# Patient Record
Sex: MALE | Race: WHITE | NOT HISPANIC OR LATINO | Employment: FULL TIME | URBAN - METROPOLITAN AREA
[De-identification: names, ages, dates, MRNs, and addresses within clinical notes are randomized per-mention and may not be internally consistent; named-entity substitution may affect disease eponyms.]

---

## 2017-01-11 ENCOUNTER — RADIANT APPOINTMENT (OUTPATIENT)
Dept: GENERAL RADIOLOGY | Facility: CLINIC | Age: 42
End: 2017-01-11
Attending: PHYSICIAN ASSISTANT
Payer: COMMERCIAL

## 2017-01-11 ENCOUNTER — OFFICE VISIT (OUTPATIENT)
Dept: FAMILY MEDICINE | Facility: CLINIC | Age: 42
End: 2017-01-11
Payer: COMMERCIAL

## 2017-01-11 VITALS
DIASTOLIC BLOOD PRESSURE: 65 MMHG | TEMPERATURE: 97.9 F | WEIGHT: 265.4 LBS | BODY MASS INDEX: 39.31 KG/M2 | HEART RATE: 71 BPM | HEIGHT: 69 IN | SYSTOLIC BLOOD PRESSURE: 126 MMHG | OXYGEN SATURATION: 100 %

## 2017-01-11 DIAGNOSIS — G47.33 OSA (OBSTRUCTIVE SLEEP APNEA): ICD-10-CM

## 2017-01-11 DIAGNOSIS — R05.3 PERSISTENT COUGH FOR 3 WEEKS OR LONGER: ICD-10-CM

## 2017-01-11 DIAGNOSIS — R05.3 PERSISTENT COUGH FOR 3 WEEKS OR LONGER: Primary | ICD-10-CM

## 2017-01-11 DIAGNOSIS — E11.9 TYPE 2 DIABETES MELLITUS WITHOUT COMPLICATION, WITHOUT LONG-TERM CURRENT USE OF INSULIN (H): ICD-10-CM

## 2017-01-11 LAB
ANION GAP SERPL CALCULATED.3IONS-SCNC: 7 MMOL/L (ref 3–14)
BUN SERPL-MCNC: 7 MG/DL (ref 7–30)
CALCIUM SERPL-MCNC: 8.7 MG/DL (ref 8.5–10.1)
CHLORIDE SERPL-SCNC: 106 MMOL/L (ref 94–109)
CO2 SERPL-SCNC: 29 MMOL/L (ref 20–32)
CREAT SERPL-MCNC: 0.84 MG/DL (ref 0.66–1.25)
CREAT UR-MCNC: 47 MG/DL
GFR SERPL CREATININE-BSD FRML MDRD: ABNORMAL ML/MIN/1.7M2
GLUCOSE SERPL-MCNC: 131 MG/DL (ref 70–99)
HBA1C MFR BLD: 6.6 % (ref 4.3–6)
LDLC SERPL DIRECT ASSAY-MCNC: 37 MG/DL
MICROALBUMIN UR-MCNC: <5 MG/L
MICROALBUMIN/CREAT UR: NORMAL MG/G CR (ref 0–17)
POTASSIUM SERPL-SCNC: 3.7 MMOL/L (ref 3.4–5.3)
SODIUM SERPL-SCNC: 142 MMOL/L (ref 133–144)

## 2017-01-11 PROCEDURE — 80048 BASIC METABOLIC PNL TOTAL CA: CPT | Performed by: PHYSICIAN ASSISTANT

## 2017-01-11 PROCEDURE — 99214 OFFICE O/P EST MOD 30 MIN: CPT | Performed by: PHYSICIAN ASSISTANT

## 2017-01-11 PROCEDURE — 83036 HEMOGLOBIN GLYCOSYLATED A1C: CPT | Performed by: PHYSICIAN ASSISTANT

## 2017-01-11 PROCEDURE — 82043 UR ALBUMIN QUANTITATIVE: CPT | Performed by: PHYSICIAN ASSISTANT

## 2017-01-11 PROCEDURE — 99207 C FOOT EXAM  NO CHARGE: CPT | Performed by: PHYSICIAN ASSISTANT

## 2017-01-11 PROCEDURE — 36415 COLL VENOUS BLD VENIPUNCTURE: CPT | Performed by: PHYSICIAN ASSISTANT

## 2017-01-11 PROCEDURE — 71020 XR CHEST 2 VW: CPT

## 2017-01-11 PROCEDURE — 83721 ASSAY OF BLOOD LIPOPROTEIN: CPT | Performed by: PHYSICIAN ASSISTANT

## 2017-01-11 RX ORDER — BENZONATATE 200 MG/1
200 CAPSULE ORAL 3 TIMES DAILY PRN
Qty: 21 CAPSULE | Refills: 0 | Status: SHIPPED | OUTPATIENT
Start: 2017-01-11 | End: 2022-03-21

## 2017-01-11 RX ORDER — CODEINE PHOSPHATE AND GUAIFENESIN 10; 100 MG/5ML; MG/5ML
1-2 SOLUTION ORAL EVERY 4 HOURS PRN
Qty: 120 ML | Refills: 0 | Status: SHIPPED | OUTPATIENT
Start: 2017-01-11 | End: 2022-03-21

## 2017-01-11 RX ORDER — ALBUTEROL SULFATE 90 UG/1
2 AEROSOL, METERED RESPIRATORY (INHALATION) EVERY 6 HOURS PRN
Qty: 1 INHALER | Refills: 0 | Status: SHIPPED | OUTPATIENT
Start: 2017-01-11 | End: 2022-03-21

## 2017-01-11 NOTE — PATIENT INSTRUCTIONS
Have plenty of rest and fluids  Humidified air can be very helpful with cough  Use inhaler every 4-6 hours during this illness  Use robitussin at night - can make you groggy  Use tessalon cough suppressants up to three times during the day to cut cough  Use nasal spray Afrin OTC for 3-4 days in each nostril for congestion  Sinus rinse/neti pot  Follow up if worsening symptoms or if not improving - call if sinuses are worsening  Be seen urgently if worsening breathing

## 2017-01-11 NOTE — MR AVS SNAPSHOT
After Visit Summary   1/11/2017    Brett Fountain    MRN: [id number]           Patient Information     Date Of Birth          1975        Visit Information        Provider Department      1/11/2017 2:20 PM Iris Garcia PA-C [facility]        Today's Diagnoses     Persistent cough for 3 weeks or longer    -  1     Type 2 diabetes mellitus without complication, without long-term current use of insulin (H)           Care Instructions    Have plenty of rest and fluids  Humidified air can be very helpful with cough  Use inhaler every 4-6 hours during this illness  Use robitussin at night - can make you groggy  Use tessalon cough suppressants up to three times during the day to cut cough  Use nasal spray Afrin OTC for 3-4 days in each nostril for congestion  Sinus rinse/neti pot  Follow up if worsening symptoms or if not improving - call if sinuses are worsening  Be seen urgently if worsening breathing         Follow-ups after your visit        Who to contact     Normal or non-critical lab and imaging results will be communicated to you by apartumhart, letter or phone within 4 business days after the clinic has received the results. If you do not hear from us within 7 days, please contact the clinic through [employer] or phone. If you have a critical or abnormal lab result, we will notify you by phone as soon as possible.  Submit refill requests through [employer] or call your pharmacy and they will forward the refill request to us. Please allow 3 business days for your refill to be completed.          If you need to speak with a  for additional information , please call: [phone]             Additional Information About Your Visit        [employer] Information     [employer] gives you secure access to your electronic health record. If you see a primary care provider, you can also send messages to your care team and make appointments. If you have questions, please call your primary  "care clinic.  If you do not have a primary care provider, please call [phone] and they will assist you.        Care EveryWhere ID     This is your Care EveryWhere ID. This could be used by other organizations to access your [facility] medical records  [id number]        Your Vitals Were     Pulse Temperature Height BMI (Body Mass Index) Pulse Oximetry       71 97.9  F (36.6  C) (Tympanic) 5' 9\" (1.753 m) 39.17 kg/m2 100%        Blood Pressure from Last 3 Encounters:   01/11/17 126/65   10/28/15 132/83   10/26/15 125/78    Weight from Last 3 Encounters:   01/11/17 265 lb 6.4 oz (120.385 kg)   10/26/15 271 lb (122.925 kg)   10/21/15 274 lb (124.286 kg)              We Performed the Following     Basic metabolic panel     Hemoglobin A1c     LDL cholesterol direct     MICROALBUMIN          Today's Medication Changes          These changes are accurate as of: 1/11/17  3:12 PM.  If you have any questions, ask your nurse or doctor.               Start taking these medicines.        Dose/Directions    albuterol 108 (90 BASE) MCG/ACT Inhaler   Commonly known as:  PROAIR HFA/PROVENTIL HFA/VENTOLIN HFA   Used for:  Persistent cough for 3 weeks or longer   Started by:  Iris Garcia PA-C        Dose:  2 puff   Inhale 2 puffs into the lungs every 6 hours as needed for shortness of breath / dyspnea or wheezing   Quantity:  1 Inhaler   Refills:  0       benzonatate 200 MG capsule   Commonly known as:  TESSALON   Used for:  Persistent cough for 3 weeks or longer   Started by:  Iris Garcia PA-C        Dose:  200 mg   Take 1 capsule (200 mg) by mouth 3 times daily as needed for cough   Quantity:  21 capsule   Refills:  0       guaiFENesin-codeine 100-10 MG/5ML Soln solution   Commonly known as:  ROBITUSSIN AC   Used for:  Persistent cough for 3 weeks or longer   Started by:  Iris Garcia PA-C        Dose:  1-2 tsp.   Take 5-10 mLs by mouth every 4 hours as needed   Quantity:  120 mL   Refills:  0          "   Where to get your medicines      These medications were sent to [facility] - [place], [place] - [address]  [address] [facility] [address]     Phone:  [phone]    - albuterol 108 (90 BASE) MCG/ACT Inhaler  - benzonatate 200 MG capsule      Some of these will need a paper prescription and others can be bought over the counter.  Ask your nurse if you have questions.     Bring a paper prescription for each of these medications    - guaiFENesin-codeine 100-10 MG/5ML Soln solution             Primary Care Provider    [facility]                Thank you!     Thank you for choosing [facility]  for your care. Our goal is always to provide you with excellent care. Hearing back from our patients is one way we can continue to improve our services. Please take a few minutes to complete the written survey that you may receive in the mail after your visit with us. Thank you!             Your Updated Medication List - Protect others around you: Learn how to safely use, store and throw away your medicines at www.disposemymeds.org.          This list is accurate as of: 1/11/17  3:12 PM.  Always use your most recent med list.                   Brand Name Dispense Instructions for use    albuterol 108 (90 BASE) MCG/ACT Inhaler    PROAIR HFA/PROVENTIL HFA/VENTOLIN HFA    1 Inhaler    Inhale 2 puffs into the lungs every 6 hours as needed for shortness of breath / dyspnea or wheezing       benzonatate 200 MG capsule    TESSALON    21 capsule    Take 1 capsule (200 mg) by mouth 3 times daily as needed for cough       guaiFENesin-codeine 100-10 MG/5ML Soln solution    ROBITUSSIN AC    120 mL    Take 5-10 mLs by mouth every 4 hours as needed

## 2017-01-11 NOTE — PROGRESS NOTES
SUBJECTIVE:                                                    Brett Fountain is a 41 year old male who presents to clinic today for the following health issues:    ENT Symptoms             Symptoms: cc Present Absent Comment   Fever/Chills   x    Fatigue  x     Muscle Aches   x    Eye Irritation x x  Both eyes red and irritated and matted shut when he woke up this morning   Sneezing       Nasal Fadi/Drg  x     Sinus Pressure/Pain x x     Loss of smell   x    Dental pain   x    Sore Throat  x  Mild irritation from coughing   Swollen Glands   x    Ear Pain/Fullness   x    Cough x x  Mostly dry but does vomit up mucus   Wheeze   x    Chest Pain   x    Shortness of breath   x    Rash       Other  x  Was traveling out of country to [facility]       Symptom duration:  about 3 weeks ago   Symptom severity:  moderate   Treatments tried:  Zpak, with some relief   Contacts:  Kids sick, recent travels out of country      Took zpak 2.5 weeks ago - had been having productive cough  Back a few days ago     Problem list and histories reviewed & adjusted, as indicated.  Additional history: none    Recent Labs   Lab Test  01/11/17   1514  10/21/15   0955  07/11/14   1442   A1C  6.6*  8.1*  7.4*   LDL  37  36   --    HDL   --   32*   --    TRIG   --   161*   --    ALT   --   66   --    CR  0.84  0.86   --    GFRESTIMATED  >90  Non  GFR Calc    >90  Non  GFR Calc     --    GFRESTBLACK  >90   GFR Calc    >90   GFR Calc     --    POTASSIUM  3.7  3.9   --    TSH   --   0.60   --       Labs reviewed in EPIC  Problem list, Medication list, Allergies, and Medical/Social/Surgical histories reviewed in [facility] and updated as appropriate.    ROS:  Other than noted above, general, HEENT, respiratory, cardiac and gastrointestinal systems are negative.     OBJECTIVE:                                                    /65 mmHg  Pulse 71  Temp(Src) 97.9  F (36.6  C) (Tympanic)   "Ht 5' 9\" (1.753 m)  Wt 265 lb 6.4 oz (120.385 kg)  BMI 39.17 kg/m2  SpO2 100% Body mass index is 39.17 kg/(m^2).   GENERAL: healthy, alert, well nourished, well hydrated, no distress  HENT: ear canals- normal; TMs- normal; Nose- normal; Mouth- no ulcers, no lesions  NECK: no tenderness, no adenopathy, no asymmetry, no masses, no stiffness; thyroid- normal to palpation  RESP: lungs clear to auscultation - no rales, no rhonchi, no wheezes  CV: regular rates and rhythm, normal S1 S2, no S3 or S4 and no murmur, no click or rub -  ABDOMEN: soft, no tenderness, no  hepatosplenomegaly, no masses, normal bowel sounds  Diabetic foot exam: normal DP and PT pulses, no trophic changes or ulcerative lesions and normal sensory exam. POSITIVE dry skin feet. Patient declines KOH prep for possible athletes foot - no itching      CXR - IMPRESSION: Heart and lungs negative. Degenerative changes in the thoracic spine.    I independently viewed the x-ray, discussed with patient, and am awaiting radiology read.      ASSESSMENT/PLAN:                                                      ASSESSMENT/PLAN:      ICD-10-CM    1. Persistent cough for 3 weeks or longer R05 XR Chest 2 Views     albuterol (PROAIR HFA/PROVENTIL HFA/VENTOLIN HFA) 108 (90 BASE) MCG/ACT Inhaler     benzonatate (TESSALON) 200 MG capsule     guaiFENesin-codeine (ROBITUSSIN AC) 100-10 MG/5ML SOLN solution   2. Type 2 diabetes mellitus without complication, without long-term current use of insulin (H) E11.9 Hemoglobin A1c     Basic metabolic panel     MICROALBUMIN     LDL cholesterol direct     Albumin Random Urine Quantitative     MICROALBUMIN     FOOT EXAM     ACE/ARB NOT PRESCRIBED, INTENTIONAL,     STATIN NOT PRESCRIBED, INTENTIONAL,   3. CRIS (obstructive sleep apnea) G47.33      Patient well appearing, breathing easily in clinic, speaking in full sentences easily, no signs of cyanosis or respiratory distress.  Dry wheezy cough in visit  Note: diabetes discussed " today. Patient agreeable to labs. He has been controlling diabetes with diet/exercise and checks blood glucose which has been okay. He does not want to talk about it too much, discussed need for follow up for diabetes.    Patient Instructions   Have plenty of rest and fluids  Humidified air can be very helpful with cough  Use inhaler every 4-6 hours during this illness  Use robitussin at night - can make you groggy  Use tessalon cough suppressants up to three times during the day to cut cough  Use nasal spray Afrin OTC for 3-4 days in each nostril for congestion  Sinus rinse/neti pot  Follow up if worsening symptoms or if not improving - call if sinuses are worsening  Be seen urgently if worsening breathing      reports that he has quit smoking. He has quit using smokeless tobacco. His smokeless tobacco use included Chew.    Iris Garcia PA-C   [facility]

## 2017-01-11 NOTE — NURSING NOTE
"Chief Complaint   Patient presents with     Sinus Problem       Initial /65 mmHg  Pulse 71  Temp(Src) 97.9  F (36.6  C) (Tympanic)  Ht 5' 9\" (1.753 m)  Wt 265 lb 6.4 oz (120.385 kg)  BMI 39.17 kg/m2  SpO2 100% Estimated body mass index is 39.17 kg/(m^2) as calculated from the following:    Height as of this encounter: 5' 9\" (1.753 m).    Weight as of this encounter: 265 lb 6.4 oz (120.385 kg).  BP completed using cuff size: anaid Davidson CMA  "

## 2017-01-12 PROBLEM — G47.33 OSA (OBSTRUCTIVE SLEEP APNEA): Status: ACTIVE | Noted: 2017-01-12

## 2020-03-02 ENCOUNTER — HEALTH MAINTENANCE LETTER (OUTPATIENT)
Age: 45
End: 2020-03-02

## 2020-12-20 ENCOUNTER — HEALTH MAINTENANCE LETTER (OUTPATIENT)
Age: 45
End: 2020-12-20

## 2021-01-20 ENCOUNTER — OFFICE VISIT - HEALTHEAST (OUTPATIENT)
Dept: FAMILY MEDICINE | Facility: CLINIC | Age: 46
End: 2021-01-20

## 2021-01-20 DIAGNOSIS — Z76.0 ENCOUNTER FOR MEDICATION REFILL: ICD-10-CM

## 2021-01-20 DIAGNOSIS — E11.9 TYPE 2 DIABETES MELLITUS WITHOUT COMPLICATION, WITHOUT LONG-TERM CURRENT USE OF INSULIN (H): ICD-10-CM

## 2021-01-20 DIAGNOSIS — R07.0 THROAT PAIN: ICD-10-CM

## 2021-01-20 DIAGNOSIS — Z11.52 ENCOUNTER FOR SCREENING FOR COVID-19: ICD-10-CM

## 2021-01-20 DIAGNOSIS — R73.9 HYPERGLYCEMIA: ICD-10-CM

## 2021-01-20 LAB
DEPRECATED S PYO AG THROAT QL EIA: NORMAL
GROUP A STREP BY PCR: NORMAL

## 2021-01-21 ENCOUNTER — COMMUNICATION - HEALTHEAST (OUTPATIENT)
Dept: SCHEDULING | Facility: CLINIC | Age: 46
End: 2021-01-21

## 2021-04-24 ENCOUNTER — HEALTH MAINTENANCE LETTER (OUTPATIENT)
Age: 46
End: 2021-04-24

## 2021-05-27 VITALS
SYSTOLIC BLOOD PRESSURE: 138 MMHG | HEART RATE: 90 BPM | RESPIRATION RATE: 12 BRPM | TEMPERATURE: 99.1 F | OXYGEN SATURATION: 98 % | DIASTOLIC BLOOD PRESSURE: 89 MMHG

## 2021-06-18 NOTE — PATIENT INSTRUCTIONS - HE
Patient Instructions by Bakari Littlejohn PA-C at 1/20/2021  8:00 AM     Author: Bakari Littlejohn PA-C Service: -- Author Type: Physician Assistant    Filed: 1/20/2021  9:09 AM Encounter Date: 1/20/2021 Status: Addendum    : Bakari Littlejohn PA-C (Physician Assistant)    Related Notes: Original Note by Bakari Littlejohn PA-C (Physician Assistant) filed at 1/20/2021  9:07 AM       Your blood glucose level is markedly high at 300.  I recommend that you go to the emergency room and have IV hydration, insulin therapy and labs to reevaluate your blood glucose and your kidney function.  Once the kidney function is obtained a refill of Metformin can be written.  I wrote a referral to family medicine to have establish care.  Establish care with a primary care provider.  Covid screening you have been screened for COVID-19 in the office.  Those results will be available in [employer] and you will be contacted in 24 hours with results.    Suggested increased rest increased fluids and bedside humidification  Over-the-counter Tylenol for comfort.  Follow packaging directions  Over-the-counter throat lozenges with benzocaine such as Cepacol may be used if indicated and is not a choking hazard based on age.  Follow packaging directions.  Do not overuse the benzocaine as it will dry the throat and make it uncomfortable.    Self-Care for Sore Throats  Sore throats happen for many reasons, such as colds, allergies, and infections caused by viruses or bacteria. In any case, your throat becomes red and sore. Your goal for self-care is to reduce your discomfort while giving your throat a chance to heal.    Moisten and soothe your throat  Tips include the following:    Try a sip of water first thing after waking up.    Keep your throat moist by drinking 6 or more glasses of clear liquids every day.    Run a cool-air humidifier in your room overnight.    Avoid cigarette smoke.     Suck on throat lozenges, cough drops, hard candy, ice chips, or  frozen fruit-juice bars. Use the sugar-free versions if your diet or medical condition requires them.  Gargle to ease irritation  Gargling every hour or 2 can ease irritation. Try gargling with 1 of these solutions:    1/4 teaspoon of salt in 1/2 cup of warm water    An over-the-counter anesthetic gargle  Use medicine for more relief  Over-the-counter medicine can reduce sore throat symptoms. Ask your pharmacist if you have questions about which medicine to use:    Ease pain with anesthetic sprays. Aspirin or an aspirin substitute also helps. Remember, never give aspirin to anyone 18 or younger, or if you are already taking blood thinners.     For sore throats caused by allergies, try antihistamines to block the allergic reaction.    Remember: unless a sore throat is caused by a bacterial infection, antibiotics wont help you.  Prevent future sore throats  Prevention tips include the following:    Stop smoking or reduce contact with secondhand smoke. Smoke irritates the tender throat lining.    Limit contact with pets and with allergy-causing substances, such as pollen and mold.    When youre around someone with a sore throat or cold, wash your hands often to keep viruses or bacteria from spreading.    Dont strain your vocal cords.  Call your healthcare provider  Contact your healthcare provider if you have:    A temperature over 101 F (38.3 C)    White spots on the throat    Great difficulty swallowing    Trouble breathing    A skin rash    Recent exposure to someone else with strep bacteria    Severe hoarseness and swollen glands in the neck or jaw   Date Last Reviewed: 8/1/2016 2000-2016 [employer]. [address] [place] [address], [place] [address]. All rights reserved. This information is not intended as a substitute for professional medical care. Always follow your healthcare professional's instructions.    Discharge Instructions for COVID-19 Patients  You have--or may have--COVID-19. Please follow the  "instructions listed below.   If you have a weakened immune system, discuss with your doctor any other actions you need to take.  How can I protect others?  If you have symptoms (fever, cough, body aches or trouble breathing):    Stay home and away from others (self-isolate) until:  ? Your other symptoms have resolved (gotten better). And?  ? You've had no fever--and no medicine that reduces fever--for 1 full day (24 hours). And?  ? At least 10 days have passed since your symptoms started. (You may need to wait 20 days. Follow the advice of your care team.)  If you don't show symptoms, but testing showed that you have COVID-19:    Stay home and away from others (self-isolate) until at least 10 days have passed since the date of your first positive COVID-19 test.  During this time    Stay in your own room, even for meals. Use your own bathroom if you can.    Stay away from others in your home. No hugging, kissing or shaking hands. No visitors.    Don't go to work, school or anywhere else.    Clean \"high touch\" surfaces often (doorknobs, counters, handles). Use household cleaning spray or wipes.    You'll find a full list of  on the EPA website: www.epa.gov/pesticide-registration/list-n-disinfectants-use-against-sars-cov-2.    Cover your mouth and nose with a mask or other face covering to avoid spreading germs.    Wash your hands and face often. Use soap and water.    Caregivers in these groups are at risk for severe illness due to COVID-19:  ? People 65 years and older  ? People who live in a nursing home or long-term care facility  ? People with chronic disease (lung, heart, cancer, diabetes, kidney, liver, immunologic)  ? People who have a weakened immune system, including those who:    Are in cancer treatment    Take medicine that weakens the immune system, such as corticosteroids    Had a bone marrow or organ transplant    Have an immune deficiency    Have poorly controlled HIV or AIDS    Are obese (body " mass index of 40 or higher)    Smoke regularly    Caregivers should wear gloves while washing dishes, handling laundry and cleaning bedrooms and bathrooms.    Use caution when washing and drying laundry: Don't shake dirty laundry and use the warmest water setting that you can.    For more tips on managing your health at home, go to www.cdc.gov/coronavirus/2019-ncov/downloads/10Things.pdf.  How can I take care of myself at home?  1. Get lots of rest. Drink extra fluids (unless a doctor has told you not to).  2. Take Tylenol (acetaminophen) for fever or pain. If you have liver or kidney problems, ask your family doctor if it's okay to take Tylenol.   Adults can take either:   ? 650 mg (two 325 mg pills) every 4 to 6 hours, or?  ? 1,000 mg (two 500 mg pills) every 8 hours as needed.  ? Note: Don't take more than 3,000 mg in one day. Acetaminophen is found in many medicines (both prescribed and over-the-counter medicines). Read all labels to be sure you don't take too much.   For children, check the Tylenol bottle for the right dose. The dose is based on the child's age or weight.  3. If you have other health problems (like cancer, heart failure, an organ transplant or severe kidney disease): Call your specialty clinic if you don't feel better in the next 2 days.  4. Know when to call 911. Emergency warning signs include:  ? Trouble breathing or shortness of breath  ? Pain or pressure in the chest that doesn't go away  ? Feeling confused like you haven't felt before, or not being able to wake up  ? Bluish-colored lips or face  5. Your doctor may have prescribed a blood thinner medicine. Follow their instructions.  Where can I get more information?     [facility] - About COVID-19: [email or web address]    CDC - What to Do If You're Sick: www.cdc.gov/coronavirus/2019-ncov/about/steps-when-sick.html    CDC - Ending Home Isolation:  www.cdc.gov/coronavirus/2019-ncov/hcp/disposition-in-home-patients.html    CDC - Caring for Someone: www.cdc.gov/coronavirus/2019-ncov/if-you-are-sick/care-for-someone.html    [facility] - Interim Guidance for Hospital Discharge to Home: www.health.[facility].[place]/diseases/coronavirus/hcp/hospdischarge.pdf    Below are the COVID-19 hotlines at the [place] Department of Health ([facility]). Interpreters are available.  ? For health questions: Call 966-909-0618 or 1-464.618.5989 (7 a.m. to 7 p.m.)  ? For questions about schools and childcare: Call 167-682-9789 or 1-737.146.5927 (7 a.m. to 7 p.m.)    For informational purposes only. Not to replace the advice of your health care provider. Clinically reviewed by Dr. Chepe Wilcox.   Copyright   2020 [facility]. All rights reserved. [employer] 800011 - REV 01/05/21.

## 2021-06-30 NOTE — PROGRESS NOTES
Progress Notes by Bakari Littlejohn PA-C at 1/20/2021  8:00 AM     Author: Bakari Littlejohn PA-C Service: -- Author Type: Physician Assistant    Filed: 1/20/2021  9:52 AM Encounter Date: 1/20/2021 Status: Signed    : Bakari Littlejohn PA-C (Physician Assistant)       Chief Complaint   Patient presents with   ? Nasal Congestion     no fever, congestion, runny nose, slight cough, no bodyaches or fatigue, headache, started friday, sore throat   ? Medication Refill     metformin         Clinical Decision Making:  I had a long conversation with patient stating that he has been off his Metformin for 6 months.  His blood glucose levels could be going up or down.  He does not have any known hemoglobin A1c recently.  It would be helpful to have a recent hemoglobin A1c to look and see what his blood sugars have been averaging.  Additionally has not had a recent creatinine in the chart so he cannot get a refill of Metformin.  I do not feel comfortable refilling his Metformin until his kidney function is known.  This was explained to the patient.  Evaluation for strep throat which was returned as negative, COVID-19 screening was obtained because of his exposure to his child and his current symptoms, and a discussion regarding his establishing care with a primary care provider and retreating his diabetes.  For short-term treatment patient will be sent to the emergency room for IV hydration therapy, insulin to get the blood glucose down and then point-of-care testing with hemoglobin A1c and basic metabolic for creatinine evaluation.  Patient will then be able to get a refill for 30 days on his Metformin.  Referral is also written to family medicine for evaluation.  Questions were answered to patient satisfaction before discharge.    Total of 60 minutes was spent reviewing the patient's chart, reviewing lab results, coordinating care for his diabetes and contacting the emergency room staff for referral.            1. Encounter for  screening for COVID-19  Symptomatic COVID-19 Virus (CORONAVIRUS) PCR   2. Throat pain  Rapid Strep A Screen-Throat swab    Group A Strep PCR Throat Swab   3. Type 2 diabetes mellitus without complication, without long-term current use of insulin (H)  Ambulatory referral to Family Practice   4. Encounter for medication refill  Ambulatory referral to Family Practice   5. Hyperglycemia  Ambulatory referral to Family Practice         Patient Instructions     Your blood glucose level is markedly high at 300.  I recommend that you go to the emergency room and have IV hydration, insulin therapy and labs to reevaluate your blood glucose and your kidney function.  Once the kidney function is obtained a refill of Metformin can be written.  I wrote a referral to family medicine to have establish care.  Establish care with a primary care provider.  Covid screening you have been screened for COVID-19 in the office.  Those results will be available in [employer] and you will be contacted in 24 hours with results.    Suggested increased rest increased fluids and bedside humidification  Over-the-counter Tylenol for comfort.  Follow packaging directions  Over-the-counter throat lozenges with benzocaine such as Cepacol may be used if indicated and is not a choking hazard based on age.  Follow packaging directions.  Do not overuse the benzocaine as it will dry the throat and make it uncomfortable.    Self-Care for Sore Throats  Sore throats happen for many reasons, such as colds, allergies, and infections caused by viruses or bacteria. In any case, your throat becomes red and sore. Your goal for self-care is to reduce your discomfort while giving your throat a chance to heal.    Moisten and soothe your throat  Tips include the following:    Try a sip of water first thing after waking up.    Keep your throat moist by drinking 6 or more glasses of clear liquids every day.    Run a cool-air humidifier in your room overnight.    Avoid  cigarette smoke.     Suck on throat lozenges, cough drops, hard candy, ice chips, or frozen fruit-juice bars. Use the sugar-free versions if your diet or medical condition requires them.  Gargle to ease irritation  Gargling every hour or 2 can ease irritation. Try gargling with 1 of these solutions:    1/4 teaspoon of salt in 1/2 cup of warm water    An over-the-counter anesthetic gargle  Use medicine for more relief  Over-the-counter medicine can reduce sore throat symptoms. Ask your pharmacist if you have questions about which medicine to use:    Ease pain with anesthetic sprays. Aspirin or an aspirin substitute also helps. Remember, never give aspirin to anyone 18 or younger, or if you are already taking blood thinners.     For sore throats caused by allergies, try antihistamines to block the allergic reaction.    Remember: unless a sore throat is caused by a bacterial infection, antibiotics wont help you.  Prevent future sore throats  Prevention tips include the following:    Stop smoking or reduce contact with secondhand smoke. Smoke irritates the tender throat lining.    Limit contact with pets and with allergy-causing substances, such as pollen and mold.    When youre around someone with a sore throat or cold, wash your hands often to keep viruses or bacteria from spreading.    Dont strain your vocal cords.  Call your healthcare provider  Contact your healthcare provider if you have:    A temperature over 101 F (38.3 C)    White spots on the throat    Great difficulty swallowing    Trouble breathing    A skin rash    Recent exposure to someone else with strep bacteria    Severe hoarseness and swollen glands in the neck or jaw   Date Last Reviewed: 8/1/2016 2000-2016 [employer]. [address] [place] [address], [place] [address]. All rights reserved. This information is not intended as a substitute for professional medical care. Always follow your healthcare professional's instructions.    Discharge  "Instructions for COVID-19 Patients  You have--or may have--COVID-19. Please follow the instructions listed below.   If you have a weakened immune system, discuss with your doctor any other actions you need to take.  How can I protect others?  If you have symptoms (fever, cough, body aches or trouble breathing):    Stay home and away from others (self-isolate) until:  ? Your other symptoms have resolved (gotten better). And?  ? You've had no fever--and no medicine that reduces fever--for 1 full day (24 hours). And?  ? At least 10 days have passed since your symptoms started. (You may need to wait 20 days. Follow the advice of your care team.)  If you don't show symptoms, but testing showed that you have COVID-19:    Stay home and away from others (self-isolate) until at least 10 days have passed since the date of your first positive COVID-19 test.  During this time    Stay in your own room, even for meals. Use your own bathroom if you can.    Stay away from others in your home. No hugging, kissing or shaking hands. No visitors.    Don't go to work, school or anywhere else.    Clean \"high touch\" surfaces often (doorknobs, counters, handles). Use household cleaning spray or wipes.    You'll find a full list of  on the EPA website: www.epa.gov/pesticide-registration/list-n-disinfectants-use-against-sars-cov-2.    Cover your mouth and nose with a mask or other face covering to avoid spreading germs.    Wash your hands and face often. Use soap and water.    Caregivers in these groups are at risk for severe illness due to COVID-19:  ? People 65 years and older  ? People who live in a nursing home or long-term care facility  ? People with chronic disease (lung, heart, cancer, diabetes, kidney, liver, immunologic)  ? People who have a weakened immune system, including those who:    Are in cancer treatment    Take medicine that weakens the immune system, such as corticosteroids    Had a bone marrow or organ " transplant    Have an immune deficiency    Have poorly controlled HIV or AIDS    Are obese (body mass index of 40 or higher)    Smoke regularly    Caregivers should wear gloves while washing dishes, handling laundry and cleaning bedrooms and bathrooms.    Use caution when washing and drying laundry: Don't shake dirty laundry and use the warmest water setting that you can.    For more tips on managing your health at home, go to www.cdc.gov/coronavirus/2019-ncov/downloads/10Things.pdf.  How can I take care of myself at home?  1. Get lots of rest. Drink extra fluids (unless a doctor has told you not to).  2. Take Tylenol (acetaminophen) for fever or pain. If you have liver or kidney problems, ask your family doctor if it's okay to take Tylenol.   Adults can take either:   ? 650 mg (two 325 mg pills) every 4 to 6 hours, or?  ? 1,000 mg (two 500 mg pills) every 8 hours as needed.  ? Note: Don't take more than 3,000 mg in one day. Acetaminophen is found in many medicines (both prescribed and over-the-counter medicines). Read all labels to be sure you don't take too much.   For children, check the Tylenol bottle for the right dose. The dose is based on the child's age or weight.  3. If you have other health problems (like cancer, heart failure, an organ transplant or severe kidney disease): Call your specialty clinic if you don't feel better in the next 2 days.  4. Know when to call 911. Emergency warning signs include:  ? Trouble breathing or shortness of breath  ? Pain or pressure in the chest that doesn't go away  ? Feeling confused like you haven't felt before, or not being able to wake up  ? Bluish-colored lips or face  5. Your doctor may have prescribed a blood thinner medicine. Follow their instructions.  Where can I get more information?    [facility] - About COVID-19: [email or web address]    CDC - What to Do If You're Sick: www.cdc.gov/coronavirus/2019-ncov/about/steps-when-sick.html    CDC -  Ending Home Isolation: www.cdc.gov/coronavirus/2019-ncov/hcp/disposition-in-home-patients.html    CDC - Caring for Someone: www.cdc.gov/coronavirus/2019-ncov/if-you-are-sick/care-for-someone.html    [facility] - Interim Guidance for Hospital Discharge to Home: www.health.[facility].mn./diseases/coronavirus/hcp/hospdischarge.pdf    Below are the COVID-19 hotlines at the [place] Department of Health ([facility]). Interpreters are available.  ? For health questions: Call 220-141-3325 or 1-126.997.8291 (7 a.m. to 7 p.m.)  ? For questions about schools and childcare: Call 509-607-2020 or 1-731.294.2588 (7 a.m. to 7 p.m.)    For informational purposes only. Not to replace the advice of your health care provider. Clinically reviewed by Dr. Chepe Wilcox.   Copyright   2020 [employer]. All rights reserved. [employer] [id number] - REV 01/05/21.           HPI:  Brett Fountain is a 45 y.o. male who is a known type II diabetic who presents today for several issues.    The first issue is that the patient is a known type II diabetic and he has a blood glucose level of 300 taken this morning at home.  Patient does not have a family practice provider and would like to have a refill of his Metformin medication for his diabetes treatment.  He has been unable to get that refilled because he does not have a primary care provider.  He has not had recent laboratory evaluation to include a basic metabolic panel with creatinine.  Patient states he has increased urinary frequency during the daytime but no overt nocturia and no problematic increased urinary frequency, gross hematuria flank or back pain.  No excessive thirst or polydipsia, polyphagia or recent weight loss.  Patient remarks that he had had recent weight gain without the Metformin.    Patient second concern is that he has his son with him today.  His son is being evaluated for cold-like symptoms and COVID-19 screening.  His son attends  and now he would like to have a screening  for COVID-19 because he has a sore throat.    His next concern is that he has a sore throat that is dry and scratchy.  Hurts when he swallows and he has nasal congestion and rhinorrhea with only a slight intermittent dry nonproductive cough.  He denies shortness of breath no fever no myalgias no fatigue no headache no loss of taste or smell no skin rash no abdominal pain vomiting or diarrhea.  He has no known sick contacts for COVID-19 other than mentioned above potentially with his son who is unknown.  His wife works at the [employer] with pharmacy.    History obtained from the patient.    Problem List:  There are no relevant problems documented for this patient.      No past medical history on file.    Social History     Tobacco Use   ? Smoking status: Never Smoker   ? Smokeless tobacco: Current User     Types: Chew   Substance Use Topics   ? Alcohol use: Not on file       Review of Systems  As above in HPI, otherwise balance of Review of Systems are negative.    Vitals:    01/20/21 0817   BP: 138/89   Pulse: 90   Resp: 12   Temp: 99.1  F (37.3  C)   TempSrc: Oral   SpO2: 98%       Physical Exam  General: Patient is resting comfortably no acute distress is afebrile  HEENT: Head is normocephalic atraumatic   eyes are PERRL EOMI sclera anicteric   TMs are clear bilaterally  Throat is with mild pharyngeal wall erythema and no exudate  No cervical lymphadenopathy present  LUNGS: Clear to auscultation bilaterally  HEART: Regular rate and rhythm  Abdomen: Nontender nondistended no rebound no guarding no masses.  No CVA tenderness to percussion  Skin: Without rash non-diaphoretic    Labs:  Recent Results (from the past 72 hour(s))   Rapid Strep A Screen-Throat swab    Specimen: Throat   Result Value Ref Range    Rapid Strep A Antigen No Group A Strep detected, presumptive negative No Group A Strep detected, presumptive negative

## 2021-08-08 ENCOUNTER — HEALTH MAINTENANCE LETTER (OUTPATIENT)
Age: 46
End: 2021-08-08

## 2021-10-03 ENCOUNTER — HEALTH MAINTENANCE LETTER (OUTPATIENT)
Age: 46
End: 2021-10-03

## 2021-12-26 ENCOUNTER — OFFICE VISIT (OUTPATIENT)
Dept: FAMILY MEDICINE | Facility: CLINIC | Age: 46
End: 2021-12-26
Payer: COMMERCIAL

## 2021-12-26 VITALS
HEART RATE: 90 BPM | DIASTOLIC BLOOD PRESSURE: 92 MMHG | SYSTOLIC BLOOD PRESSURE: 143 MMHG | OXYGEN SATURATION: 99 % | TEMPERATURE: 97.3 F

## 2021-12-26 DIAGNOSIS — R05.9 COUGH: Primary | ICD-10-CM

## 2021-12-26 PROCEDURE — 99213 OFFICE O/P EST LOW 20 MIN: CPT | Performed by: STUDENT IN AN ORGANIZED HEALTH CARE EDUCATION/TRAINING PROGRAM

## 2021-12-26 NOTE — PROGRESS NOTES
SUBJECTIVE:  Brett Fountain is an 46 year old male who presents for cough.  Patient has had symptoms of a cough for about 2 weeks.  He had some sore throat and nasal congestion and now his symptoms have mostly resolved except for a bit of a lingering cough.  He occasionally coughs up some phlegm and gags on it and last night had some dry heaves related to this and noticed an episode of epistaxis which resolved spontaneously.  Currently no fevers, chills, chest pain, shortness of breath, myalgias, nausea, vomiting, diarrhea, loss of sense of taste or smell.  Is vaccinated for COVID-19 with two doses.  No known sick contacts except for his son, who is in  and here with him today.    PMH:   has a past medical history of Diabetes (H).  Patient Active Problem List   Diagnosis     Type 2 diabetes, HbA1c goal < 7% (H)     Obesity     Type 2 diabetes mellitus without complication (H)     CRIS (obstructive sleep apnea)     Social History     Socioeconomic History     Marital status:      Spouse name: None     Number of children: None     Years of education: None     Highest education level: None   Occupational History     None   Tobacco Use     Smoking status: Never Smoker     Smokeless tobacco: Current User     Types: Chew, Chew, Chew   Substance and Sexual Activity     Alcohol use: Yes     Alcohol/week: 0.0 standard drinks     Comment: occ     Drug use: No     Sexual activity: Yes     Partners: Female   Other Topics Concern     Parent/sibling w/ CABG, MI or angioplasty before 65F 55M? Not Asked   Social History Narrative     None     Social Determinants of Health     Financial Resource Strain: Not on file   Food Insecurity: Not on file   Transportation Needs: Not on file   Physical Activity: Not on file   Stress: Not on file   Social Connections: Not on file   Intimate Partner Violence: Not on file   Housing Stability: Not on file     Family History   Problem Relation Age of Onset     Diabetes Father       Coronary Artery Disease Father      Obesity Father      Diabetes Maternal Grandmother      Alzheimer Disease Paternal Grandfather        ALLERGIES:  Patient has no known allergies.    Current Outpatient Medications   Medication     metFORMIN (GLUCOPHAGE) 500 MG tablet     ACE/ARB NOT PRESCRIBED, INTENTIONAL,     albuterol (PROAIR HFA/PROVENTIL HFA/VENTOLIN HFA) 108 (90 BASE) MCG/ACT Inhaler     benzonatate (TESSALON) 200 MG capsule     guaiFENesin-codeine (ROBITUSSIN AC) 100-10 MG/5ML SOLN solution     STATIN NOT PRESCRIBED, INTENTIONAL,     No current facility-administered medications for this visit.         ROS:  ROS is done and is negative for general/constitutional, eye, ENT, Respiratory, cardiovascular, GI, , Skin, musculoskeletal except as noted elsewhere.  All other review of systems negative except as noted elsewhere.    OBJECTIVE:  BP (!) 143/92 (BP Location: Right arm, Patient Position: Sitting, Cuff Size: Adult Regular)   Pulse 90   Temp 97.3  F (36.3  C) (Tympanic)   SpO2 99%   GENERAL APPEARANCE: Alert, in no acute distress.  EYES: Conjunctivae clear.  EARS: External ears normal.  NOSE: Normal, no drainage.  OROPHARYNX: MMM.  NECK: Supple, symmetrical.  RESP: Clear to auscultation bilaterally, no wheezing or retractions, no increased effort.  CV: Regular rate and rhythm, no murmurs, good capillary refill.  ABDOMEN: nondistended.  SKIN: No ulcers, lesions or rash.  MUSCULOSKELETAL: No gross deformities.  NEURO: No gross deficits, CN 2-12 grossly intact.    RESULTS  No results found for any visits on 12/26/21.  No results found for this or any previous visit (from the past 48 hour(s)).    ASSESSMENT/PLAN:  (R05.9) Cough  (primary encounter diagnosis)  Comment: Presentation most consistent with resolving viral URI.  He is far enough out from the onset of symptoms that he no longer requires quarantine and as such we will hold off on COVID-19 or influenza testing at this time.  Patient should gradually  improve without intervention.  Anticipatory guidance given and patient verbalized understanding.  No indication for strep testing at this time.  Plan: As above.    PPE worn: Full PPE.    Options for treatment and follow-up care were reviewed with the patient and/or guardian. Brett Fountain and/or guardian engaged in the decision making process and verbalized understanding of the options discussed and agreed with the final plan.    See [facility] for orders, medications, letters, patient instructions    Vladislav Lagunas MD

## 2022-03-20 ENCOUNTER — HEALTH MAINTENANCE LETTER (OUTPATIENT)
Age: 47
End: 2022-03-20

## 2022-03-21 ENCOUNTER — OFFICE VISIT (OUTPATIENT)
Dept: FAMILY MEDICINE | Facility: CLINIC | Age: 47
End: 2022-03-21
Payer: COMMERCIAL

## 2022-03-21 VITALS
RESPIRATION RATE: 20 BRPM | BODY MASS INDEX: 37.95 KG/M2 | DIASTOLIC BLOOD PRESSURE: 81 MMHG | SYSTOLIC BLOOD PRESSURE: 144 MMHG | HEART RATE: 97 BPM | WEIGHT: 257 LBS | OXYGEN SATURATION: 98 % | TEMPERATURE: 98.8 F

## 2022-03-21 DIAGNOSIS — J02.9 SORE THROAT: ICD-10-CM

## 2022-03-21 DIAGNOSIS — J06.9 VIRAL URI WITH COUGH: Primary | ICD-10-CM

## 2022-03-21 DIAGNOSIS — H10.9 BACTERIAL CONJUNCTIVITIS OF BOTH EYES: ICD-10-CM

## 2022-03-21 DIAGNOSIS — B96.89 BACTERIAL CONJUNCTIVITIS OF BOTH EYES: ICD-10-CM

## 2022-03-21 LAB
DEPRECATED S PYO AG THROAT QL EIA: NEGATIVE
GROUP A STREP BY PCR: NOT DETECTED

## 2022-03-21 PROCEDURE — U0005 INFEC AGEN DETEC AMPLI PROBE: HCPCS | Performed by: NURSE PRACTITIONER

## 2022-03-21 PROCEDURE — U0003 INFECTIOUS AGENT DETECTION BY NUCLEIC ACID (DNA OR RNA); SEVERE ACUTE RESPIRATORY SYNDROME CORONAVIRUS 2 (SARS-COV-2) (CORONAVIRUS DISEASE [COVID-19]), AMPLIFIED PROBE TECHNIQUE, MAKING USE OF HIGH THROUGHPUT TECHNOLOGIES AS DESCRIBED BY CMS-2020-01-R: HCPCS | Performed by: NURSE PRACTITIONER

## 2022-03-21 PROCEDURE — 87651 STREP A DNA AMP PROBE: CPT | Performed by: NURSE PRACTITIONER

## 2022-03-21 PROCEDURE — 99213 OFFICE O/P EST LOW 20 MIN: CPT | Performed by: NURSE PRACTITIONER

## 2022-03-21 RX ORDER — BLOOD SUGAR DIAGNOSTIC
1 STRIP MISCELLANEOUS
COMMUNITY
Start: 2021-01-20

## 2022-03-21 RX ORDER — POLYMYXIN B SULFATE AND TRIMETHOPRIM 1; 10000 MG/ML; [USP'U]/ML
SOLUTION OPHTHALMIC
Qty: 10 ML | Refills: 0 | Status: SHIPPED | OUTPATIENT
Start: 2022-03-21

## 2022-03-21 ASSESSMENT — ENCOUNTER SYMPTOMS
FEVER: 0
CHILLS: 0
SHORTNESS OF BREATH: 0

## 2022-03-21 NOTE — PATIENT INSTRUCTIONS
Afrin nasal spray     Keep doing Dayquil (Robitussin DM)/Nyquil      Patient Education     Bacterial Conjunctivitis    You have an infection in the membranes covering the white part of the eye. This part of the eye is called the conjunctiva. The infection is called conjunctivitis. The most common symptoms of conjunctivitis include a thick, pus-like discharge from the eye, swollen eyelids, redness, eyelids sticking together upon awakening, and a gritty or scratchy feeling in the eye. Your infection was caused by bacteria. It may be treated with medicine. With treatment, the infection takes about 7 to 10 days to resolve.   Home care    Use prescribed antibiotic eye drops or ointment as directed to treat the infection.    Apply a warm compress (towel soaked in warm water) to the affected eye 3 to 4 times a day. Do this just before applying medicine to the eye.    Use a warm, wet cloth to wipe away crusting of the eyelids in the morning. This is caused by mucus drainage during the night. You may also use saline irrigating solution or artificial tears to rinse away mucus in the eye. Do not put a patch over the eye.    Wash your hands before and after touching the infected eye. This is to prevent spreading the infection to the other eye, and to other people. Don't share your towels or washcloths with others.    You may use acetaminophen or ibuprofen to control pain, unless another medicine was prescribed. Talk with your healthcare provider before using these medicines if you have chronic liver or kidney disease. Also talk with your provider if you have ever had a stomach ulcer or digestive bleeding.    Don't wear contact lenses until your eyes have healed and all symptoms are gone.    Follow-up care  Follow up with your healthcare provider, or as advised.  When to seek medical advice  Call your healthcare provider right away if any of these occur:    Worsening vision    Increasing pain in the eye    Increasing swelling or  redness of the eyelid    Redness spreading around the eye  [employer] last reviewed this educational content on 4/1/2020 2000-2021 [employer]. All rights reserved. This information is not intended as a substitute for professional medical care. Always follow your healthcare professional's instructions.           Patient Education     Viral Upper Respiratory Illness (Adult)    You have a viral upper respiratory illness (URI), which is another term for the common cold. This illness is contagious during the first few days. It is spread through the air by coughing and sneezing. It may also be spread by direct contact (touching the sick person and then touching your own eyes, nose, or mouth). Frequent handwashing will decrease risk of spread. Most viral illnesses go away within 7 to 10 days with rest and simple home remedies. Sometimes the illness may last for several weeks. Antibiotics will not kill a virus, and they are generally not prescribed for this condition.  Home care    If symptoms are severe, rest at home for the first 2 to 3 days. When you resume activity, don't let yourself get too tired.    Don't smoke. If you need help stopping, talk with your healthcare provider.    Avoid being exposed to cigarette smoke (yours or others ).    You may use acetaminophen or ibuprofen to control pain and fever, unless another medicine was prescribed. If you have chronic liver or kidney disease, have ever had a stomach ulcer or gastrointestinal bleeding, or are taking blood-thinning medicines, talk with your healthcare provider before using these medicines. Aspirin should never be given to anyone under 18 years of age who is ill with a viral infection or fever. It may cause severe liver or brain damage.    Your appetite may be poor, so a light diet is fine. Stay well hydrated by drinking 6 to 8 glasses of fluids per day (water, soft drinks, juices, tea, or soup). Extra fluids will help loosen secretions in the  nose and lungs.    Over-the-counter cold medicines will not shorten the length of time you re sick, but they may be helpful for the following symptoms: cough, sore throat, and nasal and sinus congestion. If you take prescription medicines, ask your healthcare provider or pharmacist which over-the-counter medicines are safe to use. (Note: Don't use decongestants if you have high blood pressure.)  Follow-up care  Follow up with your healthcare provider, or as advised.  When to seek medical advice  Call your healthcare provider right away if any of these occur:    Cough with lots of colored sputum (mucus)    Severe headache; face, neck, or ear pain    Difficulty swallowing due to throat pain    Fever of 100.4 F (38 C) or higher, or as directed by your healthcare provider  Call 911  Call 911 if any of these occur:    Chest pain, shortness of breath, wheezing, or difficulty breathing    Coughing up blood    Very severe pain with swallowing, especially if it goes along with a muffled voice   [employer] last reviewed this educational content on 6/1/2018 2000-2021 [employer]. All rights reserved. This information is not intended as a substitute for professional medical care. Always follow your healthcare professional's instructions.

## 2022-03-21 NOTE — PROGRESS NOTES
Assessment & Plan     Sore throat    - Streptococcus A Rapid Screen w/Reflex to PCR - Clinic Collect  - Group A Streptococcus PCR Throat Swab    Bacterial conjunctivitis of both eyes    - trimethoprim-polymyxin b (POLYTRIM) 13744-6.1 UNIT/ML-% ophthalmic solution  Dispense: 10 mL; Refill: 0    Viral URI with cough    - Symptomatic; Auto-generated order COVID-19 Virus (Coronavirus) by PCR Nose       Focused exam and history done due to COVID-19 pandemic in a walk-in setting.      History, exam, and vital signs consistent with a viral URI as well as bacterial conjunctivitis of both eyes with exposure to same.  Patient asking for antibiotics.  Nonuse of antibiotics for viral illnesses explained.  Usual course of illness discussed.     No red flags.     Recommended Covid booster shot.  Patient states he believes this will not be helpful.  Explained that it is the best way to prevent Covid.    Isolate pending covid results.      Recheck if shortness of breath or new fevers develop.  Rest.     OTCs recommended: None [   ].  Dextromethorphan  [ x ], guaifenesin [x  ], pseudoephedrine [   ], Afrin for no greater than 3 days [ x ], Tylenol or ibuprofen [  ].                No follow-ups on file.    Tashia Robison, [facility]     Brett is a 47 year old male who presents to clinic today for the following health issues:  Chief Complaint   Patient presents with     Cold Symptoms     cough, sorethroat, fatigue x  5 days      HPI    Cough ongoing x 5 days.  Worse at night.  Taking Dayquil and Nyquil.  Mostly dry.      Hx sleep apnea.      Headache, sore throat, fatigue better.      Son had viral infection just prior to onset if his infection along with conjunctivitis.      Yellow drainage from both eyes - glued shut.  Seems to be better today.      Son's covid test was neg.            Review of Systems   Constitutional: Negative for chills and fever.   HENT: Positive for congestion.     Respiratory: Negative for shortness of breath.            Objective    BP (!) 144/81   Pulse 97   Temp 98.8  F (37.1  C) (Oral)   Resp 20   Wt 116.6 kg (257 lb)   SpO2 98%   BMI 37.95 kg/m    Physical Exam  Constitutional:       Appearance: He is well-developed.   HENT:      Right Ear: External ear normal.      Left Ear: External ear normal.   Eyes:      General:         Right eye: No discharge.         Left eye: No discharge.      Conjunctiva/sclera: Conjunctivae normal.   Cardiovascular:      Rate and Rhythm: Normal rate and regular rhythm.      Pulses: Normal pulses.      Heart sounds: Normal heart sounds.   Pulmonary:      Effort: Pulmonary effort is normal.      Breath sounds: Normal breath sounds.   Musculoskeletal:         General: Normal range of motion.   Lymphadenopathy:      Cervical: No cervical adenopathy.   Skin:     General: Skin is warm.   Neurological:      Mental Status: He is alert and oriented to person, place, and time.   Psychiatric:         Mood and Affect: Mood normal.         Behavior: Behavior normal.         Thought Content: Thought content normal.         Judgment: Judgment normal.            Results for orders placed or performed in visit on 03/21/22 (from the past 24 hour(s))   Streptococcus A Rapid Screen w/Reflex to PCR - Clinic Collect    Specimen: Throat; Swab   Result Value Ref Range    Group A Strep antigen Negative Negative

## 2022-03-22 LAB — SARS-COV-2 RNA RESP QL NAA+PROBE: NEGATIVE

## 2022-05-15 ENCOUNTER — HEALTH MAINTENANCE LETTER (OUTPATIENT)
Age: 47
End: 2022-05-15

## 2022-09-10 ENCOUNTER — HEALTH MAINTENANCE LETTER (OUTPATIENT)
Age: 47
End: 2022-09-10

## 2023-01-22 ENCOUNTER — HEALTH MAINTENANCE LETTER (OUTPATIENT)
Age: 48
End: 2023-01-22

## 2023-06-03 ENCOUNTER — HEALTH MAINTENANCE LETTER (OUTPATIENT)
Age: 48
End: 2023-06-03

## 2023-07-20 ENCOUNTER — HOSPITAL ENCOUNTER (EMERGENCY)
Facility: HOSPITAL | Age: 48
Discharge: HOME | End: 2023-07-20
Attending: EMERGENCY MEDICINE | Admitting: EMERGENCY MEDICINE
Payer: COMMERCIAL

## 2023-07-20 ENCOUNTER — APPOINTMENT (OUTPATIENT)
Dept: RADIOLOGY | Facility: HOSPITAL | Age: 48
End: 2023-07-20
Attending: EMERGENCY MEDICINE
Payer: COMMERCIAL

## 2023-07-20 VITALS
RESPIRATION RATE: 20 BRPM | DIASTOLIC BLOOD PRESSURE: 86 MMHG | HEIGHT: 70 IN | WEIGHT: 260 LBS | HEART RATE: 101 BPM | SYSTOLIC BLOOD PRESSURE: 152 MMHG | OXYGEN SATURATION: 98 % | TEMPERATURE: 98.4 F | BODY MASS INDEX: 37.22 KG/M2

## 2023-07-20 DIAGNOSIS — S42.202A CLOSED FRACTURE OF PROXIMAL END OF LEFT HUMERUS, UNSPECIFIED FRACTURE MORPHOLOGY, INITIAL ENCOUNTER: ICD-10-CM

## 2023-07-20 PROCEDURE — 73030 X-RAY EXAM OF SHOULDER: CPT | Mod: LT

## 2023-07-20 PROCEDURE — 23620 CLTX GR HMRL TBRS FX WO MNPJ: CPT | Mod: LT

## 2023-07-20 PROCEDURE — 250N000013 HC RX MED GY IP 250 OP 250 PS 637: Performed by: EMERGENCY MEDICINE

## 2023-07-20 PROCEDURE — 99283 EMERGENCY DEPT VISIT LOW MDM: CPT | Mod: 25

## 2023-07-20 RX ORDER — ACETAMINOPHEN 500 MG
1000 TABLET ORAL EVERY 8 HOURS PRN
Qty: 30 TABLET | Refills: 0 | Status: SHIPPED | OUTPATIENT
Start: 2023-07-20

## 2023-07-20 RX ORDER — OXYCODONE HYDROCHLORIDE 5 MG/1
5 TABLET ORAL EVERY 4 HOURS PRN
Qty: 12 TABLET | Refills: 0 | Status: SHIPPED | OUTPATIENT
Start: 2023-07-20 | End: 2023-07-23

## 2023-07-20 RX ORDER — ACETAMINOPHEN 325 MG/1
975 TABLET ORAL ONCE
Status: COMPLETED | OUTPATIENT
Start: 2023-07-20 | End: 2023-07-20

## 2023-07-20 RX ORDER — OXYCODONE HYDROCHLORIDE 5 MG/1
5 TABLET ORAL ONCE
Status: COMPLETED | OUTPATIENT
Start: 2023-07-20 | End: 2023-07-20

## 2023-07-20 RX ADMIN — ACETAMINOPHEN 975 MG: 325 TABLET ORAL at 22:47

## 2023-07-20 RX ADMIN — OXYCODONE HYDROCHLORIDE 5 MG: 5 TABLET ORAL at 22:46

## 2023-07-20 ASSESSMENT — ACTIVITIES OF DAILY LIVING (ADL): ADLS_ACUITY_SCORE: 35

## 2023-07-20 ASSESSMENT — ENCOUNTER SYMPTOMS
NECK PAIN: 0
BACK PAIN: 0

## 2023-07-20 NOTE — Clinical Note
Brett Fountain was seen and treated in our emergency department on 7/20/2023.  He may return to work on 07/31/2023.       If you have any questions or concerns, please don't hesitate to call.      Zenon Ha MD

## 2023-07-21 NOTE — ED TRIAGE NOTES
"Pt arrives ambulatory to triage for a shoulder injury. Pt was in a rolling desk chair and began to fall backwards and attempted to brace his fall. Pt came down onto left arm and he felt something \"pop\" in his shoulder area. CMS intact distally to site of injury. Pt is alert and oriented, calm and cooperative. Pt took 4 ibuprofen at around 9:30 pm for pain of 7/10.       "

## 2023-07-21 NOTE — ED PROVIDER NOTES
Emergency Department Encounter      NAME: Brett Fountain  AGE: 48 year old male  YOB: 1975  MRN: [id number]  EVALUATION DATE & TIME: 2023  9:43 PM    PCP: No Ref-Primary, Physician    ED PROVIDER: Zenon Ha M.D.      Chief Complaint   Patient presents with     Shoulder Injury         FINAL IMPRESSION:  1. Closed fracture of proximal end of left humerus, unspecified fracture morphology, initial encounter        MEDICAL DECISION MAKIN:53 PM I met with the patient, obtained history, performed an initial exam, and discussed options and plan for diagnostics and treatment here in the ED.     This patient is a 48-year-old male who presents with left shoulder pain.  He says that about an hour before coming to the ER he was in a chair and when he pushed backward onto he fell backward.  He tried to break his fall with his outstretched left arm but instead landed on his left shoulder.  Since then he has had pain in the left shoulder and it is worse with any movement.  On exam he had tenderness over the lateral left shoulder but not over the rest of the humerus or scapula.  His clavicle is nontender.  I considered fractures of all these areas as well.  His forearm is nontender.  I x-rayed his left shoulder and he has a avulsion fracture of the greater tuberosity of the proximal humerus.  I independently reviewed and interpreted the x-ray I had treated the patient in the ER with some ice packs and pain medications.  A sling initially had been placed but I change this to a shoulder immobilizer after seeing the fracture.  I discussed treatment of the fracture with the patient and the need for him to follow-up with orthopedics.  He was given the contact information for [facility].    Pertinent Labs & Imaging studies reviewed. (See chart for details)    The importance of close follow up was discussed. We reviewed warning signs and symptoms, and I instructed Mr. Fountain to return to the  emergency department immediately if he develops any new or worsening symptoms. I provided additional verbal discharge instructions. Mr. Fountain expressed understanding and agreement with this plan of care, his questions were answered, and he was discharged in stable condition.     Medical Decision Making     History:    Supplemental history from: Documented in chart, if applicable    External Record(s) reviewed: Documented in chart, if applicable.     Work Up:    Chart documentation includes differential considered and any EKGs or imaging independently interpreted by provider, where specified.    In additional to work up documented, I considered the following work up: Documented in chart, if applicable.     External consultation:    Discussion of management with another provider: Documented in chart, if applicable     Complicating factors:    Care impacted by chronic illness: Type 2 diabetes     Care affected by social determinants of health: Access to Medical Care     Disposition considerations:  Discharge patient home with a prescription for oxycodone and Tylenol to use for pain as well as a shoulder immobilizer and referral to orthopedics.      MEDICATIONS GIVEN IN THE EMERGENCY:  Medications   acetaminophen (TYLENOL) tablet 975 mg (975 mg Oral $Given 7/20/23 2247)   oxyCODONE (ROXICODONE) tablet 5 mg (5 mg Oral $Given 7/20/23 2246)       NEW PRESCRIPTIONS STARTED AT TODAY'S ER VISIT:  Discharge Medication List as of 7/20/2023 11:08 PM      START taking these medications    Details   acetaminophen (TYLENOL) 500 MG tablet Take 2 tablets (1,000 mg) by mouth every 8 hours as needed for mild pain, Disp-30 tablet, R-0, Local Print      oxyCODONE (ROXICODONE) 5 MG tablet Take 1 tablet (5 mg) by mouth every 4 hours as needed for pain, Disp-12 tablet, R-0, Local Print                =================================================================    HPI    Patient information was obtained from: patient     Use of  : N/A      Brettgeena Fountain is a 48 year old male with a past medical history of DM2 who presents with shoulder pain.    The patient presents with left shoulder pain after he fell out of his rolling desk chair ~45 minutes ago. The patient states that he was leaning back in the chair and rolled out to the left so that he did not hit his head. Rather than bracing with his left hand, he landed directly on his left shoulder. He took 4 ibuprofen afterwards and then drove himself here.     He denies neck pain, elbow pain, wrist pain, back pain, clavicle pain, or any other complaints at this time.       SHx: The patient's wife and 4 year old son are on the way to the airport to  family that will be staying with them. They live close by so his wife is able to pick him up if needed.       REVIEW OF SYSTEMS   Review of Systems   Musculoskeletal: Negative for back pain and neck pain.        Positive for left shoulder pain   All other systems reviewed and are negative.       PAST MEDICAL HISTORY:  Past Medical History:   Diagnosis Date     Diabetes (H)        PAST SURGICAL HISTORY:  No past surgical history on file.    CURRENT MEDICATIONS:    No current facility-administered medications for this encounter.    Current Outpatient Medications:      acetaminophen (TYLENOL) 500 MG tablet, Take 2 tablets (1,000 mg) by mouth every 8 hours as needed for mild pain, Disp: 30 tablet, Rfl: 0     oxyCODONE (ROXICODONE) 5 MG tablet, Take 1 tablet (5 mg) by mouth every 4 hours as needed for pain, Disp: 12 tablet, Rfl: 0     ACE/ARB NOT PRESCRIBED, INTENTIONAL,, ACE & ARB not prescribed due to Other: well controlled blood pressure and diabetes, Disp: , Rfl:      blood glucose (PRECISION QID TEST) test strip, 1 each, Disp: , Rfl:      metFORMIN (GLUCOPHAGE) 500 MG tablet, Start with 500 mg with breakfast x 3 days, increase to 500 mg twice daily x 3 days, increase 1000 mg AM and 500 mg PM x 3 days, then finally 1000 mg twice daily  "going forward., Disp: , Rfl:      STATIN NOT PRESCRIBED, INTENTIONAL,, Please choose reason not prescribed, below, Disp: , Rfl:      trimethoprim-polymyxin b (POLYTRIM) 97333-7.1 UNIT/ML-% ophthalmic solution, 1 drop to each eye every 3 hours while awake for two days. Reduce to 1 drop each eye 4 times daily for 5 days., Disp: 10 mL, Rfl: 0    ALLERGIES:  No Known Allergies    FAMILY HISTORY:  Family History   Problem Relation Age of Onset     Diabetes Father      Coronary Artery Disease Father      Obesity Father      Diabetes Maternal Grandmother      Alzheimer Disease Paternal Grandfather        SOCIAL HISTORY:   Social History     Socioeconomic History     Marital status:    Tobacco Use     Smoking status: Never     Smokeless tobacco: Current     Types: Chew   Substance and Sexual Activity     Alcohol use: Yes     Alcohol/week: 0.0 standard drinks of alcohol     Comment: occ     Drug use: No     Sexual activity: Yes     Partners: Female       PHYSICAL EXAM:    Vitals: BP (!) 152/86   Pulse 101   Temp 98.4  F (36.9  C) (Tympanic)   Resp 20   Ht 1.778 m (5' 10\")   Wt 117.9 kg (260 lb)   SpO2 98%   BMI 37.31 kg/m     Constitutional: Well developed, well nourished. Uncomfortable appearing white male guarding his left arm.  HEAD:Normocephalic, atraumatic,   Eyes: PERRLA, EOM intact, conjunctiva clear, no discharge  ENT: mucous membranes moist, nose normal.   Neck- Supple, gross ROM intact.  No JVD.  No palpable nodes.  Pulmonary: Clear to auscultation bilaterally, no respiratory distress, no wheezing, speaks full sentences easily.  Chest: No chest wall tenderness  Cardiovascular: Normal heart rate, regular rhythm, no murmurs. No lower extremity edema, 2+ DP pulses.   Musculoskeletal: Tenderness to palpation over lateral left shoulder. No deformity of crepitus. Fingers neurovascularly intact.   Skin: Warm, dry, no rash.  Neurologic: Alert & oriented x 3, speech clear, moving all extremities spontaneously "   Psychiatric: Affect normal, cooperative.     LAB:  All pertinent labs reviewed and interpreted.  Labs Ordered and Resulted from Time of ED Arrival to Time of ED Departure - No data to display    RADIOLOGY:  XR Shoulder Left 2 Views   Final Result   IMPRESSION: Interval development of avulsion fracture involving the greater tuberosity of the proximal left humerus. Mild degenerative changes left shoulder. Soft tissue swelling.        PROCEDURES:   Procedures       I, Alexx Moore, am serving as a scribe to document services personally performed by Dr. Zenon Ha based on my observation and the provider's statements to me. IZenon M.D. attest that Alexx Moore is acting in a scribe capacity, has observed my performance of the services and has documented them in accordance with my direction.      Zenon Ha M.D.  Emergency Medicine  [facility] EMERGENCY DEPARTMENT  [facility] [facility] [address]  [phone]  Dept: [phone]     Zenon Ha MD  07/21/23 0133

## 2023-07-21 NOTE — ED NOTES
Pt was sitting in wheeled office chair and started falling backwards. When trying to catch himself, he fell to the side and put his left hand out to catch himself. Pt complains of left shoulder pain that radiates down him arm.

## 2023-07-21 NOTE — ED NOTES
Pt drove to ED. Explained that he will need a ride home due to pain medication. Pt states his wife will pick him up.

## 2023-07-23 ENCOUNTER — HEALTH MAINTENANCE LETTER (OUTPATIENT)
Age: 48
End: 2023-07-23

## 2024-02-18 ENCOUNTER — HEALTH MAINTENANCE LETTER (OUTPATIENT)
Age: 49
End: 2024-02-18

## 2024-07-07 ENCOUNTER — HEALTH MAINTENANCE LETTER (OUTPATIENT)
Age: 49
End: 2024-07-07

## 2024-09-15 ENCOUNTER — HEALTH MAINTENANCE LETTER (OUTPATIENT)
Age: 49
End: 2024-09-15

## 2025-04-05 ENCOUNTER — HEALTH MAINTENANCE LETTER (OUTPATIENT)
Age: 50
End: 2025-04-05

## 2025-07-19 ENCOUNTER — HEALTH MAINTENANCE LETTER (OUTPATIENT)
Age: 50
End: 2025-07-19